# Patient Record
(demographics unavailable — no encounter records)

---

## 2025-04-14 NOTE — HISTORY OF PRESENT ILLNESS
[FreeTextEntry1] : PCP Ethan Kenney MD ??  Pt is an 86 y/o, BARD1+, black female here for follow up visit.  7/15/20 S/P Right BCS SLNBx IDC ER/DC positive (90/80%) and Her2 neg. Path pT1c IDC (12mm) G2 negative margins (closest margin 6mm from posterior) DCIS G2 negative margins (>10mm from secondary posterior), N0 0/2, no LVI, Bx site changes.  Here with her daughter, Peri.  No breast complaints. No c/o lymphedema and has full ROM. Patient did have telehealth consultation with genetics- Dr. Oviedo in 12/2023.   S/p radiation 9/17/20 per Dr. Haider (no longer f.u's with him) and on Exemestane with Dr. Peña (sees q 6 mos) and she is taking calcium.  --1/13/20 [facility] Core Bx R 1:00 N14: IDC G2 ER/DC+ (90/80%) [id number] 2+ CISH negative.  3/10/23 LHR, Bilat dMMG: FG. Postsurg changes are seen in the R upper quadrant. No susp masses, architectural distortion or significant calcs are detected. Arterial vascular calcs are present. Impression: no mmg evidence of malignancy. Rec annual screening. BR2.  2/4/2025 LHR bilat sMMG and complete US: Scattered areas of FG density. Postlumpectomy changes demonstrated R UOQ. Typical benign-appearing calcs and vascular calcs bilaterally. Surgical clips right axilla. US: No suspicious solid or complex cystic mass is detected in either breast. No morphologically abnormal axillary LN are detected. Postsurgical changes right axilla. Minimal residual seroma. IMPRESSION: No mmg or US evidence of malignancy. No significant change. RECOMMENDATION: Annual screening. BR2.   FHx mother with ovarian cancer at 42.

## 2025-04-14 NOTE — PHYSICAL EXAM
[Normocephalic] : normocephalic [Atraumatic] : atraumatic [Supple] : supple [No Supraclavicular Adenopathy] : no supraclavicular adenopathy [No Thyromegaly] : no thyromegaly [Examined in the supine and seated position] : examined in the supine and seated position [Asymmetrical] : asymmetrical [Bra Size: ___] : Bra Size: [unfilled] [No dominant masses] : no dominant masses in right breast  [No dominant masses] : no dominant masses left breast [No Nipple Retraction] : no left nipple retraction [No Nipple Discharge] : no left nipple discharge [No Axillary Lymphadenopathy] : no left axillary lymphadenopathy [No Edema] : no edema [No Swelling] : no swelling [Full ROM] : full range of motion [No Rashes] : no rashes [No Ulceration] : no ulceration [de-identified] : Bilat pendulous breasts, Right post ebrt changes in the skin, well healed UOQ and axillary inc. Left is neg. No adenopathy, Full ROM but right forearm above wrist with subtle edema.  [de-identified] : Subtle swelling in forearm near wrist on right.

## 2025-04-14 NOTE — CONSULT LETTER
[Dear  ___] : Dear  [unfilled], [Courtesy Letter:] : I had the pleasure of seeing your patient, [unfilled], in my office today. [Please see my note below.] : Please see my note below. [Consult Closing:] : Thank you very much for allowing me to participate in the care of this patient.  If you have any questions, please do not hesitate to contact me. [Sincerely,] : Sincerely, [FreeTextEntry3] : Pilar Munguia MD  [DrBoo  ___] : Dr. ZEPEDA

## 2025-04-14 NOTE — DATA REVIEWED
[FreeTextEntry1] : 2/4/2025 LHR bilat sMMG and complete US: Scattered areas of FG density. Postlumpectomy changes demonstrated R UOQ. Typical benign-appearing calcs and vascular calcs bilaterally. Surgical clips right axilla. US: No suspicious solid or complex cystic mass is detected in either breast. No morphologically abnormal axillary LN are detected. Postsurgical changes right axilla. Minimal residual seroma. IMPRESSION: No mmg or US evidence of malignancy. No significant change. RECOMMENDATION: Annual screening. BR2.

## 2025-04-14 NOTE — PROCEDURE
[FreeTextEntry1] : [facility]  [FreeTextEntry2] : lymphedema monitoring [FreeTextEntry3] : The patient had a f.u. SOZO measurement which I reviewed today. The score is abnormal and elevated, see scanned to EMR. Bioimpedance spectroscopy helps identify the onset of lymphedema in an arm or leg before patients experience noticeable swelling. Research has shown that the early detection of lymphedema using L-Dex combined with treatment can reduce progression to chronic lymphedema by 95% in breast cancer patients. Whenever possible, patients are tested for baseline L-Dex score before cancer treatment begins and then are reassessed during regular follow-up visits using the SOZO device. Otherwise, this can be started postoperatively and continued during regular follow-up visits. If the patients L-Dex score increases above normal levels, that is a sign that lymphedema is developing and a referral is made to physical therapy for further evaluation and early compression treatment. Lymphedema assessment with the SOZO L-Dex score is recommended to be done every 3 months for the first 3 years and then every 6 months for years 4 and 5 followed by annually afterwards.

## 2025-04-14 NOTE — ASSESSMENT
[FreeTextEntry1] : PCP Ethan Kenney MD  Pt is an 88 y/o, BARD1+, black female here for follow up visit.  7/15/20 S/P Right BCS SLNBx IDC ER/OR positive (90/80%) and Her2 neg. Path pT1c IDC (12mm) G2 negative margins (closest margin 6mm from posterior) DCIS G2 negative margins (>10mm from secondary posterior), N0 0/2, no LVI, Bx site changes.  Here with her daughter, Peri.  No breast complaints. No c/o lymphedema and has full ROM. Patient did have telehealth consultation with genetics- Dr. Oviedo in 12/2023.   S/p radiation 9/17/20 per Dr. Haider (no longer f.u's with him) and on Exemestane with Dr. Peña (sees q 6 mos) and she is taking calcium.  --1/13/20 NW Core Bx R 1:00 N14: IDC G2 ER/OR+ (90/80%) [id number] 2+ CISH negative.  3/10/23 LHR, Bilat dMMG: FG. Postsurg changes are seen in the R upper quadrant. No susp masses, architectural distortion or significant calcs are detected. Arterial vascular calcs are present. Impression: no mmg evidence of malignancy. Rec annual screening. BR2.  2/4/2025 LHR bilat sMMG and complete US: Scattered areas of FG density. Postlumpectomy changes demonstrated R UOQ. Typical benign-appearing calcs and vascular calcs bilaterally. Surgical clips right axilla. US: No suspicious solid or complex cystic mass is detected in either breast. No morphologically abnormal axillary LN are detected. Postsurgical changes right axilla. Minimal residual seroma. IMPRESSION: No mmg or US evidence of malignancy. No significant change. RECOMMENDATION: Annual screening. BR2.   FHx mother with ovarian cancer at 42.  CBE: 42DD Bilat pendulous breasts, Right post ebrt changes in the skin, well healed UOQ and axillary inc. Left is neg. No adenopathy, Full ROM and slight edema on right above wrist.  Reviewed with Pt and daughter recent mmg and u/s 2/25 LHR BR2 and benign CBE. Reviewed abnormal sozo and clinical forearm lymphedema, she has only 2 LN removed and almost 5 years ago. Rec appt with lymphedema specialist and possible PT. WIll refer to Dr. Brown and he can see her in [place].   PLAN: Pt is almost 5 years out.  Dr. Brown for abnormal sozo and clinical findings. F.u with Dr. Peña as scheduled.  F.u after mmg 2/26 with NP.

## 2025-04-14 NOTE — PROCEDURE
[FreeTextEntry1] : [facility]  [FreeTextEntry2] : lymphedema monitoring [FreeTextEntry3] : The patient had a f.u. SOZO measurement which I reviewed today. The score is abnormal and elevated, see scanned to EMR. Bioimpedance spectroscopy helps identify the onset of lymphedema in an arm or leg before patients experience noticeable swelling. Research has shown that the early detection of lymphedema using L-Dex combined with treatment can reduce progression to chronic lymphedema by 95% in breast cancer patients. Whenever possible, patients are tested for baseline L-Dex score before cancer treatment begins and then are reassessed during regular follow-up visits using the SOZO device. Otherwise, this can be started postoperatively and continued during regular follow-up visits. If the patients L-Dex score increases above normal levels, that is a sign that lymphedema is developing and a referral is made to physical therapy for further evaluation and early compression treatment. Lymphedema assessment with the SOZO L-Dex score is recommended to be done every 3 months for the first 3 years and then every 6 months for years 4 and 5 followed by annually afterwards.

## 2025-04-14 NOTE — PAST MEDICAL HISTORY
[Postmenopausal] : The patient is postmenopausal [Menarche Age ____] : age at menarche was [unfilled] [Menopause Age____] : age at menopause was [unfilled] [Total Preg ___] : G[unfilled] [Live Births ___] : P[unfilled]  [Living ___] : Living: [unfilled] [Age At Live Birth ___] : Age at live birth: [unfilled] [FreeTextEntry8] : yes

## 2025-04-14 NOTE — HISTORY OF PRESENT ILLNESS
[FreeTextEntry1] : PCP Ethan Kenney MD ??  Pt is an 86 y/o, BARD1+, black female here for follow up visit.  7/15/20 S/P Right BCS SLNBx IDC ER/KS positive (90/80%) and Her2 neg. Path pT1c IDC (12mm) G2 negative margins (closest margin 6mm from posterior) DCIS G2 negative margins (>10mm from secondary posterior), N0 0/2, no LVI, Bx site changes.  Here with her daughter, Peri.  No breast complaints. No c/o lymphedema and has full ROM. Patient did have telehealth consultation with genetics- Dr. Oviedo in 12/2023.   S/p radiation 9/17/20 per Dr. Haider (no longer f.u's with him) and on Exemestane with Dr. Peña (sees q 6 mos) and she is taking calcium.  --1/13/20 NW Core Bx R 1:00 N14: IDC G2 ER/KS+ (90/80%) [id number] 2+ CISH negative.  3/10/23 LHR, Bilat dMMG: FG. Postsurg changes are seen in the R upper quadrant. No susp masses, architectural distortion or significant calcs are detected. Arterial vascular calcs are present. Impression: no mmg evidence of malignancy. Rec annual screening. BR2.  2/4/2025 LHR bilat sMMG and complete US: Scattered areas of FG density. Postlumpectomy changes demonstrated R UOQ. Typical benign-appearing calcs and vascular calcs bilaterally. Surgical clips right axilla. US: No suspicious solid or complex cystic mass is detected in either breast. No morphologically abnormal axillary LN are detected. Postsurgical changes right axilla. Minimal residual seroma. IMPRESSION: No mmg or US evidence of malignancy. No significant change. RECOMMENDATION: Annual screening. BR2.   FHx mother with ovarian cancer at 42.

## 2025-04-14 NOTE — ASSESSMENT
[FreeTextEntry1] : PCP Ethan Kenney MD  Pt is an 86 y/o, BARD1+, black female here for follow up visit.  7/15/20 S/P Right BCS SLNBx IDC ER/VT positive (90/80%) and Her2 neg. Path pT1c IDC (12mm) G2 negative margins (closest margin 6mm from posterior) DCIS G2 negative margins (>10mm from secondary posterior), N0 0/2, no LVI, Bx site changes.  Here with her daughter, Peri.  No breast complaints. No c/o lymphedema and has full ROM. Patient did have telehealth consultation with genetics- Dr. Oviedo in 12/2023.   S/p radiation 9/17/20 per Dr. Haider (no longer f.u's with him) and on Exemestane with Dr. Peña (sees q 6 mos) and she is taking calcium.  --1/13/20 NW Core Bx R 1:00 N14: IDC G2 ER/VT+ (90/80%) Dez3adv 2+ CISH negative.  3/10/23 LHR, Bilat dMMG: FG. Postsurg changes are seen in the R upper quadrant. No susp masses, architectural distortion or significant calcs are detected. Arterial vascular calcs are present. Impression: no mmg evidence of malignancy. Rec annual screening. BR2.  2/4/2025 LHR bilat sMMG and complete US: Scattered areas of FG density. Postlumpectomy changes demonstrated R UOQ. Typical benign-appearing calcs and vascular calcs bilaterally. Surgical clips right axilla. US: No suspicious solid or complex cystic mass is detected in either breast. No morphologically abnormal axillary LN are detected. Postsurgical changes right axilla. Minimal residual seroma. IMPRESSION: No mmg or US evidence of malignancy. No significant change. RECOMMENDATION: Annual screening. BR2.   FHx mother with ovarian cancer at 42.  CBE: 42DD Bilat pendulous breasts, Right post ebrt changes in the skin, well healed UOQ and axillary inc. Left is neg. No adenopathy, Full ROM and slight edema on right above wrist.  Reviewed with Pt and daughter recent mmg and u/s 2/25 LHR BR2 and benign CBE. Reviewed abnormal sozo and clinical forearm lymphedema, she has only 2 LN removed and almost 5 years ago. Rec appt with lymphedema specialist and possible PT. WIll refer to Dr. Brown and he can see her in [place].   PLAN: Pt is almost 5 years out.  Dr. Brwon for abnormal sozo and clinical findings. F.u with Dr. Peña as scheduled.  F.u after mmg 2/26 with NP.

## 2025-04-14 NOTE — PHYSICAL EXAM
[Normocephalic] : normocephalic [Atraumatic] : atraumatic [Supple] : supple [No Supraclavicular Adenopathy] : no supraclavicular adenopathy [No Thyromegaly] : no thyromegaly [Examined in the supine and seated position] : examined in the supine and seated position [Asymmetrical] : asymmetrical [Bra Size: ___] : Bra Size: [unfilled] [No dominant masses] : no dominant masses in right breast  [No dominant masses] : no dominant masses left breast [No Nipple Retraction] : no left nipple retraction [No Nipple Discharge] : no left nipple discharge [No Axillary Lymphadenopathy] : no left axillary lymphadenopathy [No Edema] : no edema [No Swelling] : no swelling [Full ROM] : full range of motion [No Rashes] : no rashes [No Ulceration] : no ulceration [de-identified] : Bilat pendulous breasts, Right post ebrt changes in the skin, well healed UOQ and axillary inc. Left is neg. No adenopathy, Full ROM but right forearm above wrist with subtle edema.  [de-identified] : Subtle swelling in forearm near wrist on right.

## 2025-04-22 NOTE — DATA REVIEWED
[FreeTextEntry1] : 1/2025 labs: WBC: 8.06 Hgb: 11.2 Plt: 219 Neutrophil #: 5.11 Creatinine: 1.56 (1.28, 1.31) AST: 34 ALT: 22  RUE Bioimpedance Spectroscopy 4.9 (4/14/25, red), -4.9 (3/20/23, green), -1.9 (9/1/22, green)

## 2025-04-22 NOTE — ASSESSMENT
[FreeTextEntry1] : 87-year-old female here for evaluation.  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 Pilar Munguia about today's visit and plan.   She has persistent lymphedema from cancer-related treatment. This is impacting her ADLs and quality of life. She has been compliant with exercise and elevation for well over a month and still has persistent symptoms. She will start therapy and obtain a compression garment after therapy. Strongly recommend pneumatic compression pump for further treatment to assist with improvement in edema and function. She also has mild radiation fibrosis of the right breast, and she would benefit also from a chest piece.   #History of breast cancer -Recommend 150-300 minutes of moderate intensity aerobic exercise and 2-3 strength trainings per week.  #Impaired gait and ADLs -She does most of her ADLs by herself, but her children assist -Consider [facility] PT to work on strength, balance and endurance in the future after MLD   Follow-up in 8 weeks  Spent a total of 60 minutes including but not limited to preparing for the visit, obtaining history, performing physical exam, ordering medication, placing referrals, documenting, educating the patient and communicating with co-providers.

## 2025-04-22 NOTE — HISTORY OF PRESENT ILLNESS
[FreeTextEntry1] : Ms. Sybil Bay is an 87-year-old female with history of right breast cancer s/p right breast lumpectomy and SLNB (0/2) on 07/15/20, radiation (09/2020) and osteoporosis. She is on Exemestane (since 3/2020). Referred by Dr. Pilar Munguia for abnormal Sozo and clinical forearm lymphedema.  She is here today with her son and daughter.   She is unsure when the RUE swelling started but it was noticed by her another doctor at the recent doctor visit. The patient also had an elevated Sozo value. Denies any decreased shoulder ROM. Denies any pain.   She has a rolling walker at home, but she doesn't use it. She uses a cane. She has a wheelchair for long distances. She does many ADLs by herself, but her children help with LE dressing and shoes.

## 2025-04-22 NOTE — PHYSICAL EXAM
[FreeTextEntry1] : Gen: Patient is awake and alert and oriented x3, NAD HEENT: EOMI, hearing impaired Resp: No labored breathing CV: Equal chest excursions GI: Non-distended Skin: No rashes, erythema Lymph: RUE edema. Right breast mild fibrosis.  Inspection: No instability ROM: Full throughout Palpation: No tenderness to palpation Sensation: Intact to light touch Strength: 4/5 throughout Special tests: -Hoffmans sign. -Vaughn Gait: Wide-based gait and walks with a cane. She needs to use her hands to do a sit to stand.

## 2025-05-15 NOTE — DATA REVIEWED
[FreeTextEntry1] : RUE Bioimpedance Spectroscopy 4.9 (4/14/25, red), -4.9 (3/20/23, green), -1.9 (9/1/22, green)  4/24/25 RUE ultrasound: No evidence of right upper extremity deep venous thrombosis.

## 2025-05-15 NOTE — ASSESSMENT
[FreeTextEntry1] : 87-year-old female here for follow-up:  #RUE lymphedema with right breast radiation fibrosis  -Lymphedema education provided -Previously reviewed Sozo values with the patient and family -4/24/25 RUE ultrasound: No evidence of right upper extremity deep venous thrombosis. -Lymphedema therapy referral placed____________ ? -Obtain compression garment after completion of therapy -Obtain pneumatic pump: In process__________ ?  #History of breast cancer -Recommend 150-300 minutes of moderate intensity aerobic exercise and 2-3 strength trainings per week.  #Impaired gait and ADLs -She does most of her ADLs by herself, but her children assist -Consider [facility] PT to work on strength, balance and endurance in the future after MLD  Follow-up in __________  Spent a total of ______ minutes including but not limited to preparing for the visit, obtaining history, performing physical exam, documenting and educating the patient.

## 2025-05-22 NOTE — PHYSICAL EXAM
[FreeTextEntry1] : Gen: Patient is awake and alert and oriented x3, NAD HEENT: EOMI, hearing impaired Resp: No labored breathing CV: Equal chest excursions GI: Non-distended Skin: No rashes, erythema Lymph: RUE edema. Right breast mild fibrosis.  Inspection: No instability ROM: Full throughout Palpation: No tenderness to palpation Sensation: Intact to light touch Strength: 4/5 throughout Gait: Wide-based gait and walks with a cane. She needs to use her hands to do a sit to stand.

## 2025-05-22 NOTE — ASSESSMENT
[FreeTextEntry1] : 87-year-old female here for follow-up:  #RUE lymphedema with right breast radiation fibrosis  -Lymphedema education provided -Previously reviewed Sozo values with the patient and family -4/24/25 RUE ultrasound: No evidence of right upper extremity deep venous thrombosis. -She did not do Lymphedema therapy because it was not covered by insurance. I spoke to Nilson Jamesco and unfortunately, [facility] does not accept her insurance across all locations. I reached out to Soyeon Jo to see if there are any lymphedema clinics outside of [facility] near her home. My team will send this list to their e-mail.  -Obtain compression garment after completion of therapy but if unable to find a lymphedema clinic that accepts her insurance, then will obtain the compression garment immediately -Obtain pneumatic pump: In process. I spoke to Satish Mcneil from [facility]. Her pump is covered by insurance. A pump demonstration is scheduled but not done yet.   #History of breast cancer -Recommend 150-300 minutes of moderate intensity aerobic exercise and 2-3 strength trainings per week.  #Impaired gait and ADLs -She does most of her ADLs by herself, but her children assist -Consider [facility] PT to work on strength, balance and endurance in the future after MLD  Follow-up in 8 weeks  Spent a total of 20 minutes including but not limited to preparing for the visit, obtaining history, performing physical exam, documenting and educating the patient.

## 2025-05-22 NOTE — HISTORY OF PRESENT ILLNESS
[FreeTextEntry1] : Ms. Sybil Bay is an 87-year-old female with history of right breast cancer s/p right breast lumpectomy and SLNB (0/2) on 07/15/20, radiation (09/2020) and osteoporosis. She is on Exemestane (since 3/2020). Referred by Dr. Pilar Munguia for abnormal Sozo and clinical forearm lymphedema.  She is here today with her daughter.   She did not do therapy because her insurance was not covered.   She is unsure if her pump is covered.

## 2025-07-08 NOTE — HISTORY OF PRESENT ILLNESS
[FreeTextEntry1] : Ms. Sybil Bay is an 87-year-old female with history of right breast cancer s/p right breast lumpectomy and SLNB (0/2) on 07/15/20, radiation (09/2020) and osteoporosis. She is on Exemestane (since 3/2020). Referred by Dr. Pilar Munguia for abnormal Sozo and clinical forearm lymphedema.  She is here today with her daughter.

## 2025-07-08 NOTE — ASSESSMENT
[FreeTextEntry1] : 87-year-old female here for follow-up:  #RUE lymphedema with right breast radiation fibrosis  -Lymphedema education previously provided -Previously reviewed Sozo values with the patient and family -4/24/25 RUE ultrasound: No evidence of right upper extremity deep venous thrombosis. -She did not do Lymphedema therapy because it was not covered by insurance. I spoke to Nilson Cardozo and unfortunately, [facility] does not accept her insurance across all locations. I reached out to Soyeon Jo to see if there are any lymphedema clinics outside of [facility] near her home. My team will send this list to their e-mail. _______ ? -Obtain compression garment after completion of therapy but if unable to find a lymphedema clinic that accepts her insurance, then will obtain the compression garment immediately_______ ? -Continue pneumatic pump: ___________ ?  #History of breast cancer -Recommend 150-300 minutes of moderate intensity aerobic exercise and 2-3 strength trainings per week.  #Impaired gait and ADLs -She does most of her ADLs by herself, but her children assist -Consider [facility] PT to work on strength, balance and endurance in the future after MLD  Follow-up in 8 weeks  Spent a total of ____ minutes including but not limited to preparing for the visit, obtaining history, performing physical exam, documenting and educating the patient.

## 2025-07-08 NOTE — ASSESSMENT
[FreeTextEntry1] : 87-year-old female here for follow-up:  #RUE lymphedema with right breast radiation fibrosis  -Lymphedema education previously provided -Previously reviewed Sozo values with the patient and family -4/24/25 RUE ultrasound: No evidence of right upper extremity deep venous thrombosis. -She did not do Lymphedema therapy because it was not covered by insurance. I spoke to Nilson Cardozo and unfortunately, [facility] does not accept her insurance across all locations. I reached out to Soyeon Jo to see if there are any lymphedema clinics outside of [facility] near her home. My team will send this list to their e-mail. _______ ? -Obtain compression garment after completion of therapy but if unable to find a lymphedema clinic that accepts her insurance, then will obtain the compression garment immediately_______ ? -Continue pneumatic pump: ___________ ?  #History of breast cancer -Recommend 150-300 minutes of moderate intensity aerobic exercise and 2-3 strength trainings per week.  #Impaired gait and ADLs -She does most of her ADLs by herself, but her children assist -Consider [facility] PT to work on strength, balance and endurance in the future after MLD  Follow-up in 8 weeks  Spent a total of ____ minutes including but not limited to preparing for the visit, obtaining history, performing physical exam, documenting and educating the patient.

## 2025-07-08 NOTE — ASSESSMENT
[FreeTextEntry1] : 87-year-old female here for follow-up:  #RUE lymphedema with right breast radiation fibrosis  -Lymphedema education previously provided -Previously reviewed Sozo values with the patient and family -4/24/25 RUE ultrasound: No evidence of right upper extremity deep venous thrombosis. -She did not do Lymphedema therapy because it was not covered by insurance. I spoke to Nilson Cardozo and unfortunately, [facility] does not accept her insurance across all locations. I reached out to Soyeon Jo to see if there are any lymphedema clinics outside of [facility] near her home. My team will send this list to their e-mail. _______ ? -Obtain compression garment after completion of therapy but if unable to find a lymphedema clinic that accepts her insurance, then will obtain the compression garment immediately_______ ? -Continue pneumatic pump: ___________ ?  #History of breast cancer -Recommend 150-300 minutes of moderate intensity aerobic exercise and 2-3 strength trainings per week.  #Impaired gait and ADLs -She does most of her ADLs by herself, but her children assist -Consider [facility] to work on strength, balance and endurance in the future after MLD  Follow-up in 8 weeks  Spent a total of ____ minutes including but not limited to preparing for the visit, obtaining history, performing physical exam, documenting and educating the patient.

## 2025-07-08 NOTE — ASSESSMENT
[FreeTextEntry1] : 87-year-old female here for follow-up:  #RUE lymphedema with right breast radiation fibrosis  -Lymphedema education previously provided -Previously reviewed Sozo values with the patient and family -4/24/25 RUE ultrasound: No evidence of right upper extremity deep venous thrombosis. -She did not do Lymphedema therapy because it was not covered by insurance. I spoke to Nilson Cardozo and unfortunately, [facility] does not accept her insurance across all locations. I reached out to Soyeon Jo to see if there are any lymphedema clinics outside of [facility] near her home. My team will send this list to their e-mail. _______ ? -Obtain compression garment after completion of therapy but if unable to find a lymphedema clinic that accepts her insurance, then will obtain the compression garment immediately_______ ? -Continue pneumatic pump: ___________ ?  #History of breast cancer -Recommend 150-300 minutes of moderate intensity aerobic exercise and 2-3 strength trainings per week.  #Impaired gait and ADLs -She does most of her ADLs by herself, but her children assist -Consider [facility] to work on strength, balance and endurance in the future after MLD  Follow-up in 8 weeks  Spent a total of ____ minutes including but not limited to preparing for the visit, obtaining history, performing physical exam, documenting and educating the patient.

## 2025-07-08 NOTE — DATA REVIEWED
[FreeTextEntry1] : RUE Bioimpedance Spectroscopy 4.9 (4/14/25, red), -4.9 (3/20/23, green), -1.9 (9/1/22, green)

## 2025-07-24 NOTE — ASSESSMENT
[FreeTextEntry1] : 87-year-old female here for follow-up:  #RUE lymphedema with right breast radiation fibrosis  -Lymphedema education previously provided -Previously reviewed Sozo values with the patient and family -4/24/25 RUE ultrasound: No evidence of right upper extremity deep venous thrombosis. -Lymphedema therapy at [facility] and non-[facility] facilities not covered by insurance.  -Obtain compression garment: Will send information to [employer].  -Obtain pneumatic pump: Not covered by insurance. I gave them the financial assistance paperwork. I also updated Satish Mcneil from [employer]  #History of breast cancer -Recommend 150-300 minutes of moderate intensity aerobic exercise and 2-3 strength trainings per week.  #Impaired gait and ADLs -She does most of her ADLs by herself, but her children assist -Continue [facility]: Son is paying out of pocket  Follow-up in 3 months  Spent a total of 20 minutes including but not limited to preparing for the visit, obtaining history, performing physical exam, documenting, educating the patient and communicating with co-providers.

## 2025-07-24 NOTE — HISTORY OF PRESENT ILLNESS
[FreeTextEntry1] : Ms. Sybil Bay is an 87-year-old female with history of right breast cancer s/p right breast lumpectomy and SLNB (0/2) on 07/15/20, radiation (09/2020) and osteoporosis. She is on Exemestane (since 3/2020). Referred by Dr. Pilar Munguia for abnormal Sozo and clinical forearm lymphedema.  She is here today with her son.  She is doing [facility], the son is paying out of pocket.  Lymphedema therapy at [facility] and non[facility] facilities did not accept her insurance.   Her pump was not covered by insurance.

## 2025-07-24 NOTE — HISTORY OF PRESENT ILLNESS
[FreeTextEntry1] : Ms. Sybil Bay is an 87-year-old female with history of right breast cancer s/p right breast lumpectomy and SLNB (0/2) on 07/15/20, radiation (09/2020) and osteoporosis. She is on Exemestane (since 3/2020). Referred by Dr. Pilar Munguia for abnormal Sozo and clinical forearm lymphedema.  She is here today with her son.  She is doing [facility], the son is paying out of pocket.  Lymphedema therapy at [facility] and non-[facility] facilities did not accept her insurance.   Her pump was not covered by insurance.

## 2025-07-24 NOTE — HISTORY OF PRESENT ILLNESS
[FreeTextEntry1] : Ms. Sybil Bay is an 87-year-old female with history of right breast cancer s/p right breast lumpectomy and SLNB (0/2) on 07/15/20, radiation (09/2020) and osteoporosis. She is on Exemestane (since 3/2020). Referred by Dr. Pilar Munguia for abnormal Sozo and clinical forearm lymphedema.  She is here today with her son.  She is doing [facility], the son is paying out of pocket.  Lymphedema therapy at [facility] and non-[facility] facilities did not accept her insurance.   Her pump was not covered by insurance.

## 2025-07-24 NOTE — ASSESSMENT
[FreeTextEntry1] : 87-year-old female here for follow-up:  #RUE lymphedema with right breast radiation fibrosis  -Lymphedema education previously provided -Previously reviewed Sozo values with the patient and family -4/24/25 RUE ultrasound: No evidence of right upper extremity deep venous thrombosis. -Lymphedema therapy at [facility] and non[facility] facilities not covered by insurance.  -Obtain compression garment: Will send information to [employer].  -Obtain pneumatic pump: Not covered by insurance. I gave them the financial assistance paperwork. I also updated Satish Mcneil from [employer]  #History of breast cancer -Recommend 150-300 minutes of moderate intensity aerobic exercise and 2-3 strength trainings per week.  #Impaired gait and ADLs -She does most of her ADLs by herself, but her children assist -Continue [facility] PT: Son is paying out of pocket  Follow-up in 3 months  Spent a total of 20 minutes including but not limited to preparing for the visit, obtaining history, performing physical exam, documenting, educating the patient and communicating with co-providers.

## 2025-07-24 NOTE — ASSESSMENT
[FreeTextEntry1] : 87-year-old female here for follow-up:  #RUE lymphedema with right breast radiation fibrosis  -Lymphedema education previously provided -Previously reviewed Sozo values with the patient and family -4/24/25 RUE ultrasound: No evidence of right upper extremity deep venous thrombosis. -Lymphedema therapy at [facility] and non-[facility] facilities not covered by insurance.  -Obtain compression garment: Will send information to [employer].  -Obtain pneumatic pump: Not covered by insurance. I gave them the financial assistance paperwork. I also updated Satish Mcneil from [employer]  #History of breast cancer -Recommend 150-300 minutes of moderate intensity aerobic exercise and 2-3 strength trainings per week.  #Impaired gait and ADLs -She does most of her ADLs by herself, but her children assist -Continue [facility] PT: Son is paying out of pocket  Follow-up in 3 months  Spent a total of 20 minutes including but not limited to preparing for the visit, obtaining history, performing physical exam, documenting, educating the patient and communicating with co-providers.

## 2025-07-24 NOTE — PHYSICAL EXAM
[FreeTextEntry1] : Gen: Patient is awake and alert and oriented x3, NAD HEENT: EOMI, hearing impaired Resp: No labored breathing CV: Equal chest excursions GI: Non-distended Skin: No rashes, erythema Lymph: RUE edema. Right breast mild fibrosis.  Inspection: No instability ROM: Full throughout Palpation: No tenderness to palpation Sensation: Intact to light touch Strength: 4/5 throughout Gait: Wide-based gait and walks with a cane.